# Patient Record
(demographics unavailable — no encounter records)

---

## 2025-03-24 NOTE — DISCUSSION/SUMMARY
[FreeTextEntry1] : Patient is a 72-year-old black female with prior history of hypertension and hyperlipidemia who presents with a new right bundle branch block she currently has no symptoms related to the bundle branch block no dizziness no lightheadedness and no syncope she has been troubled by what sounds like radicular left-sided neck pain.  Echocardiogram reveals normal LV function with mild mitral regurgitation.  Right-sided structures are normal physical exam is also notable for a left-sided bruit she has some tortuosity and some turbulence noted in the mid left internal carotid artery with a slightly elevated velocity in addition to taking statins I do believe she would benefit from taking a baby aspirin 2-3 times a week for platelet inhibition.  I informed her that the natural history of the right bundle branch block is fairly benign barring symptoms of dizziness lightheadedness and there is a rare likelihood that she will need a pacemaker in the future.  45 minutes was spent during this consultation [EKG obtained to assist in diagnosis and management of assessed problem(s)] : EKG obtained to assist in diagnosis and management of assessed problem(s)

## 2025-03-24 NOTE — PHYSICAL EXAM
[5th Left ICS - MCL] : palpated at the 5th LICS in the midclavicular line [Normal Rate] : normal [Normal S1] : normal S1 [Normal S2] : normal S2 [II] : a grade 2 [Left Carotid Bruit] : left carotid bruit heard [Normal] : no edema, no cyanosis, no clubbing, no varicosities [Right Carotid Bruit] : no bruit heard over the right carotid [de-identified] : left bruit

## 2025-03-24 NOTE — REVIEW OF SYSTEMS
MD Montanez Printers at bedside reviewing discharge instructions with patient [Rash] : no rash [Skin Lesions] : no skin lesions [Negative] : ENT [FreeTextEntry9] : neck pain

## 2025-03-24 NOTE — HISTORY OF PRESENT ILLNESS
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 There exists no previous history of diabetes, heart attack, or stroke. The patient is currently taking medication for high blood pressure and cholesterol management. - Chief Complaint (CC) : Patient consulted for chest pain originating from neck to back. - History of Present Illness (HPI) : Patient reports a new right bundle. No chest pain or shortness of breath. No known history of diabetes, heart attack, or stroke. Pain is likely due to a pinched nerve. There's been a change in recent reading from January, but the patient is generally active, with no limitations, and occasionally experiences dizziness. - Past Medical History : Patient is on medication for high blood pressure and cholesterol. No history of diabetes, heart attack, or stroke. She has been having pain from the neck, leading into the back and has been using 800 milligrams of ibuprofen for the pain. - Past Surgical History : Patient underwent Lipoma and Myomectomy. - Family History : No known family history of heart disease. - Social History : Patient is not a smoker and leads an active lifestyle. - Review of Systems : Presently, apart from the primary complaint of neck to back pain, there are no significant findings in cardiovascular, respiratory, gastrointestinal, neurological systems, etc. - Medications : The patient is taking blood pressure and cholesterol medications and using ibuprofen for neck to back pain. - Allergies : The patient has a known allergy to Ampicillin. Objective: - Diagnostic Results : There remains a resounding uncertainty in regards to a new right bundle bran. Awaiting further test results. - Vital Signs : Details not provided during conversation. - Physical Examination (PE) : Physical examination findings currently unavailable. Assessment: - Summary : The patient presents with neck to back pain. The pain seems attributable to a pinched nerve rather than a heart condition. Notable is a new right bundle branch block; however, it likely poses no significant cause for concern. - Problems : - New right bundle branch block  - Differential Diagnosis : - Pinched Nerve vs Heart Condition  Plan: - Summary : The ensuing plan of action involves reviewing the patient's change in recent reading to better understand its implications, if any. The patient should curtail ibuprofen use owing to its potential to elevate blood pressure. - Plan : - 1. Review recent change in patient's reading. 2. Advise against consistent use of ibuprofen due to potential rise in blood pressure. 3. Monitor the new right bundle branch block.